# Patient Record
Sex: MALE | Race: BLACK OR AFRICAN AMERICAN | NOT HISPANIC OR LATINO | URBAN - METROPOLITAN AREA
[De-identification: names, ages, dates, MRNs, and addresses within clinical notes are randomized per-mention and may not be internally consistent; named-entity substitution may affect disease eponyms.]

---

## 2020-01-05 ENCOUNTER — EMERGENCY (EMERGENCY)
Facility: HOSPITAL | Age: 25
LOS: 0 days | Discharge: HOME | End: 2020-01-05
Attending: EMERGENCY MEDICINE
Payer: COMMERCIAL

## 2020-01-05 VITALS
HEIGHT: 68 IN | TEMPERATURE: 98 F | HEART RATE: 66 BPM | RESPIRATION RATE: 16 BRPM | OXYGEN SATURATION: 97 % | WEIGHT: 169.98 LBS | DIASTOLIC BLOOD PRESSURE: 36 MMHG | SYSTOLIC BLOOD PRESSURE: 134 MMHG

## 2020-01-05 DIAGNOSIS — S93.402A SPRAIN OF UNSPECIFIED LIGAMENT OF LEFT ANKLE, INITIAL ENCOUNTER: ICD-10-CM
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
## 2020-01-05 DIAGNOSIS — M25.572 PAIN IN LEFT ANKLE AND JOINTS OF LEFT FOOT: ICD-10-CM
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
## 2020-01-05 PROCEDURE — 73630 X-RAY EXAM OF FOOT: CPT | Mod: 26,LT

## 2020-01-05 PROCEDURE — 99283 EMERGENCY DEPT VISIT LOW MDM: CPT

## 2020-01-05 PROCEDURE — 73610 X-RAY EXAM OF ANKLE: CPT | Mod: 26,LT

## 2020-01-05 PROCEDURE — 99053 MED SERV 10PM-8AM 24 HR FAC: CPT

## 2020-01-05 RX ORDER — ACETAMINOPHEN 500 MG
975 TABLET ORAL ONCE
Refills: 0 | Status: COMPLETED | OUTPATIENT
Start: 2020-01-05 | End: 2020-01-05

## 2020-01-05 RX ADMIN — Medication 975 MILLIGRAM(S): at 02:36

## 2020-01-05 NOTE — ED ADULT NURSE NOTE - NSIMPLEMENTINTERV_GEN_ALL_ED
Implemented All Fall Risk Interventions:  [place] to call system. Call bell, personal items and telephone within reach. Instruct patient to call for assistance. Room bathroom lighting operational. Non-slip footwear when patient is off stretcher. Physically safe environment: no spills, clutter or unnecessary equipment. Stretcher in lowest position, wheels locked, appropriate side rails in place. Provide visual cue, wrist band, yellow gown, etc. Monitor gait and stability. Monitor for mental status changes and reorient to person, place, and time. Review medications for side effects contributing to fall risk. Reinforce activity limits and safety measures with patient and family.

## 2020-01-05 NOTE — ED ADULT NURSE NOTE - OBJECTIVE STATEMENT
Patient presents in ED accompanied by correction officers complaining of pain and swelling to right ankle, pain 7/10

## 2020-01-05 NOTE — ED ADULT NURSE NOTE - CHIEF COMPLAINT QUOTE
Patient brought in by Correction officers Patient is prisoner. Patient reports "I hurt my right foot and ankle. I rolled it playing basketball." + edema. Distal pulses intact. Security notified.

## 2020-01-05 NOTE — ED PROVIDER NOTE - PATIENT PORTAL LINK FT
You can access the FollowMyHealth Patient Portal offered by [facility] by registering at the following website: [email or web address]. By joining [employer]’s FollowMyHealth portal, you will also be able to view your health information using other applications (apps) compatible with our system.

## 2020-01-05 NOTE — ED ADULT TRIAGE NOTE - CHIEF COMPLAINT QUOTE
Patient brought in by Correction officers Patient is prisoner. Patient reports "I hurt my right foot and ankle. I rolled it playing basketball." + edema. Distal pulses intact Patient brought in by Correction officers Patient is prisoner. Patient reports "I hurt my right foot and ankle. I rolled it playing basketball." + edema. Distal pulses intact. Security notified.

## 2020-01-05 NOTE — ED PROVIDER NOTE - OBJECTIVE STATEMENT
24 year old male without significant PMH presenting to ED due to ankle pain/swelling on left after jumping while playing basketball - noting inversion and pain and swelling on left ankle since that time.